# Patient Record
Sex: FEMALE | Race: OTHER
[De-identification: names, ages, dates, MRNs, and addresses within clinical notes are randomized per-mention and may not be internally consistent; named-entity substitution may affect disease eponyms.]

---

## 2019-08-12 ENCOUNTER — HOSPITAL ENCOUNTER (EMERGENCY)
Dept: HOSPITAL 72 - EMR | Age: 59
Discharge: HOME | End: 2019-08-12
Payer: MEDICAID

## 2019-08-12 VITALS — HEIGHT: 63 IN | WEIGHT: 200 LBS | BODY MASS INDEX: 35.44 KG/M2

## 2019-08-12 VITALS — SYSTOLIC BLOOD PRESSURE: 145 MMHG | DIASTOLIC BLOOD PRESSURE: 77 MMHG

## 2019-08-12 VITALS — SYSTOLIC BLOOD PRESSURE: 138 MMHG | DIASTOLIC BLOOD PRESSURE: 75 MMHG

## 2019-08-12 DIAGNOSIS — L03.116: Primary | ICD-10-CM

## 2019-08-12 PROCEDURE — 96374 THER/PROPH/DIAG INJ IV PUSH: CPT

## 2019-08-12 PROCEDURE — 99283 EMERGENCY DEPT VISIT LOW MDM: CPT

## 2019-08-12 PROCEDURE — 73620 X-RAY EXAM OF FOOT: CPT

## 2019-08-12 PROCEDURE — 96372 THER/PROPH/DIAG INJ SC/IM: CPT

## 2019-08-12 NOTE — EMERGENCY ROOM REPORT
History of Present Illness


General


Chief Complaint:  Skin Rash/Abscess


Source:  Patient





Present Illness


HPI


59-year-old Mongolian-speaking female with no significant past medical history 

here complaining of pain and swelling as well as pruritus in the left foot.  

Patient reports that she has been having the symptoms for the past 2 months.  

Patient was recently diagnosed with eczema and foot fungus and reports that she 

currently takes medication in that regard prescribed to her by her podiatrist.  

However her left foot is swollen and warm to touch.  Patient is rating her pain 

10 out of 10 without radiation.  Denies any calf tenderness.  Denies tingling 

and numbness.  Reports that there has been minimal pus drainage from her left 

foot.  Denies any recent fall or injury.  Denies chest pain, shortness of breath

, palpitation, abdominal pain, and all other associated symptoms.  Patient is 

ambulatory and is able to bear weight on the affected side.  Patient appears to 

have an underlying psychiatric disorder unknown.


Allergies:  


Coded Allergies:  


     No Known Allergies (Unverified , 11/19/12)





Patient History


Past Medical History:  see triage record


Past Surgical History:  unable to obtain


Pertinent Family History:  none


Pregnant Now:  No


Immunizations:  UTD


Reviewed Nursing Documentation:  PMH: Agreed; PSxH: Agreed





Nursing Documentation-PMH


Past Medical History:  No Stated History


Hx Gastrointestinal Problems:  Yes





Review of Systems


All Other Systems:  negative except mentioned in HPI





Physical Exam





Vital Signs








  Date Time  Temp Pulse Resp B/P (MAP) Pulse Ox O2 Delivery O2 Flow Rate FiO2


 


8/12/19 19:44 97.9 72 22 145/77 (99) 96 Room Air  








Sp02 EP Interpretation:  reviewed, normal


General Appearance:  normal inspection, well appearing, no apparent distress, 

alert


Head:  normocephalic, atraumatic


Eyes:  bilateral eye normal inspection, bilateral eye PERRL


ENT:  normal ENT inspection, normal pharynx


Neck:  normal inspection, full range of motion, supple


Respiratory:  normal inspection, chest non-tender, lungs clear, no rhonchi, no 

respiratory distress, no wheezing


Cardiovascular #1:  normal inspection, regular rate, rhythm, no edema, no JVD, 

no murmur, normal capillary refill


Cardiovascular #2:  2+ dorsalis pedis (R), 2+ dorsalis pedis (L)


Gastrointestinal:  normal inspection, soft


Rectal:  deferred


Genitourinary:  no CVA tenderness


Musculoskeletal:  back normal


Neurologic:  alert, oriented x3, responsive


Psychiatric:  normal inspection, judgement/insight normal


Skin:  other - Cellulitis left foot


Lymphatic:  normal inspection, no adenopathy





Medical Decision Making


PA Attestation


All diagnoses and treatment plans were reviewed and discussed with my 

supervising physician Dr. Bonds


Diagnostic Impression:  


 Primary Impression:  


 Cellulitis of left foot


ER Course


59-year-old Mongolian-speaking female with no significant past medical history 

here complaining of pain and swelling as well as pruritus in the left foot.  

Patient reports that she has been having the symptoms for the past 2 months.  

Patient was recently diagnosed with eczema and foot fungus and reports that she 

currently takes medication in that regard prescribed to her by her podiatrist.  

However her left foot is swollen and warm to touch.  Patient is rating her pain 

10 out of 10 without radiation.  Denies any calf tenderness.  Denies tingling 

and numbness.  Reports that there has been minimal pus drainage from her left 

foot.  Denies any recent fall or injury.  Denies chest pain, shortness of breath

, palpitation, abdominal pain, and all other associated symptoms.  Patient is 

ambulatory and is able to bear weight on the affected side.  Patient appears to 

have an underlying psychiatric disorder unknown.





Ddx considered but are not limited to : Cellulitis, DVT, superficial infection, 

abscess





Vital signs: are WNL, pt. is afebrile





H&PE are most consistent with: Cellulitis left foot





ORDERS: Rocephin, x-ray left foot, Keflex


ED INTERVENTIONS: Rocephin





DISCHARGE: At this time pt. is stable for d/c to home. Will provide printed 

patient care instructions, and any necessary prescriptions. Care plan and 

follow up instructions have been discussed with the patient prior to discharge.


Follow-up with your primary care provider if worsening symptoms return to the 

emergency room at this time no signs of deep vein thromboses noted no foreign 

body noted on the x-ray patient is stable to be discharged.


Other X-Ray Diagnostic Results


Other X-Ray Diagnostic Results :  


   X-Ray ordered:  left foot


   # of Views/Limited Vs Complete:  3 View


   Indication:  Pain


   EP Interpretation:  Yes


   PA Xray:  Interpretation reviewed, by supervising MD, and agrees with 

findings.


   Interpretation:  no dislocation, no fractures, other - no FB


   Impression:  No acute disease


   Electronically Signed by:  Sai Crain PA-C





Last Vital Signs








  Date Time  Temp Pulse Resp B/P (MAP) Pulse Ox O2 Delivery O2 Flow Rate FiO2


 


8/12/19 20:07 97.9 74 16 145/77 98 Room Air  








Disposition:  HOME, SELF-CARE


Condition:  Stable


Patient Instructions:  Cellulitis, Easy-to-Read





Additional Instructions:  


Follow-up with your primary care provider take medication as directed at this 

time no signs of blood clot noted and no foreign body noted in your x-ray.  If 

worsening symptoms, fever and chills return to the emergency room.











Sai Jama Aug 12, 2019 20:31

## 2019-08-12 NOTE — NUR
ED Nurse Note:





pt cleared to be d/c per ER provider, pt discharge and aftercare instruction 
provided to pt per charge nurse, pt left w/ all belongings.

## 2019-08-12 NOTE — NUR
ED Nurse Note:



pt walked in c/o rash and pain on left lower extremity x 2 months, pt reports 
it was oozing pus as well. noted redness with scab, swelling rash on LL ankle 
area, will cont monitor.

## 2019-08-13 NOTE — DIAGNOSTIC IMAGING REPORT
Indication: Pain.

 

Technique: XRAY Foot 2v L

 

Comparison: None

 

Findings: 

 

No acute fracture or dislocation. Joint spaces are maintained. Lisfranc alignment

cannot be accurately assessed due to obliquity of AP view. No acute soft tissue

abnormality.

 

Impression: 

 

No acute fracture or dislocation.

## 2019-08-30 ENCOUNTER — HOSPITAL ENCOUNTER (EMERGENCY)
Dept: HOSPITAL 72 - EMR | Age: 59
Discharge: HOME | End: 2019-08-30
Payer: MEDICAID

## 2019-08-30 VITALS — BODY MASS INDEX: 40.18 KG/M2 | WEIGHT: 250 LBS | HEIGHT: 66 IN

## 2019-08-30 VITALS — SYSTOLIC BLOOD PRESSURE: 143 MMHG | DIASTOLIC BLOOD PRESSURE: 73 MMHG

## 2019-08-30 VITALS — SYSTOLIC BLOOD PRESSURE: 138 MMHG | DIASTOLIC BLOOD PRESSURE: 76 MMHG

## 2019-08-30 DIAGNOSIS — L03.115: Primary | ICD-10-CM

## 2019-08-30 PROCEDURE — 99282 EMERGENCY DEPT VISIT SF MDM: CPT

## 2019-08-30 NOTE — NUR
ED Nurse Note:





pt cleared to be d/c per ERMD, pt discharge and aftercare instruction provided 
w/ prescription, pt education done via discussion and handout, pt advised to 
follow up with pcp, list of clinic referral given, pt verbalized understanding 
and agrees with plan, pt advised to return to ed if changes in condition, vss, 
ambulatory w/steady gait, left w/ all belongings.

## 2019-08-30 NOTE — EMERGENCY ROOM REPORT
History of Present Illness


General


Chief Complaint:  Pain


Source:  Patient





Present Illness


HPI


Disclaimer: Please note that this report is being documented using DRAGON 

technology. This can lead to erroneous entry secondary to incorrect 

interpretation by the dictating instrument.





HPI: 59-year-old female history of eczema presents for evaluation of right foot 

itching.  Symptoms present roughly 6 months after dropping bleachers some over-

the-counter cleaning product onto her foot.  Notes itching, swelling and 

because of her repeated scratching intermittent bleeding and purulent 

discharge.  She was prescribed antibiotics at an urgent care several days ago 

however she never filled her prescription.  Is able to ambulate.  Denies 

significant pain.  Only complaining of itching.  No systemic systems.


 


PMH: Eczema


 


PSH: See chart


 


Allergies: None listed


 


Social Hx: Denies alcohol or drug abuse


Allergies:  


Coded Allergies:  


     No Known Allergies (Unverified , 11/19/12)





Nursing Documentation-PMH


Past Medical History:  No Stated History


Hx Gastrointestinal Problems:  Yes





Review of Systems


All Other Systems:  negative except mentioned in HPI





Physical Exam





Vital Signs








  Date Time  Temp Pulse Resp B/P (MAP) Pulse Ox O2 Delivery O2 Flow Rate FiO2


 


8/30/19 20:50 97.9 100 14 143/73 (96) 98 Room Air  





 





General: Awake and alert, no acute distress


HEENT: NC/AT. EOMI. 


Resp: Normal work of breathing.


Skin: Intact.  Erythematous, warm to the touch over the dorsum of the right 

foot.  No overlying skin breakdown, no blistering, no bleeding.


MSK: Normal tone and bulk. Moving all extremities.  No obvious deformity.


Neuro: Awake and alert.  Mentating appropriately.





Medical Decision Making


Diagnostic Impression:  


 Primary Impression:  


 Cellulitis of foot


ER Course


This 59-year-old female presenting for evaluation of 6 months intermittent 

itching, pain of the right foot.  Differential includes was not limited to 

cellulitis, occult fracture, allergic reaction, contact dermatitis, eczema.  

Patient had negative x-rays on the 12th of this month showing no fracture.  

Likely, this is a early cellulitis which we will treat with Keflex however we 

will also prescribe hydrocortisone cream for her intense itching.  She can 

follow-up with her primary care provider as an outpatient.  We discussed 

reasons to return to the emergency department.  She understands and agrees with 

this treatment plan.





Last Vital Signs








  Date Time  Temp Pulse Resp B/P (MAP) Pulse Ox O2 Delivery O2 Flow Rate FiO2


 


8/30/19 20:50 97.9 100 14 143/73 (96) 98 Room Air  








Disposition:  HOME, SELF-CARE


Condition:  Stable


Scripts


Cephalexin* (KEFLEX*) 500 Mg Capsule


500 MG ORAL EVERY 12 HOURS for 7 Days, #14 CAP 0 Refills


   Prov: Phillip Bonds MD         8/30/19 


Hydrocortisone Hc 2.5% Cream (ANUSOL-HC 2.5% CREAM) Y Cr


30 GM RC BID, #30 GM


   Prov: Phillip Bonds MD         8/30/19


Referrals:  


[facility]


Patient Instructions:  Contact Dermatitis, Easy-to-Read, Preseptal Cellulitis, 

Adult





Additional Instructions:  


Use the antibiotics and the medicated cream for treatment of your foot 

infection and itchiness.  Please see your podiatrist or your primary doctor as 

soon as possible to discuss this emergency department visit and for 

reevaluation.  Return to the emergency department any new or worsening symptoms











Phillip Bonds MD Aug 30, 2019 21:21